# Patient Record
Sex: MALE | URBAN - METROPOLITAN AREA
[De-identification: names, ages, dates, MRNs, and addresses within clinical notes are randomized per-mention and may not be internally consistent; named-entity substitution may affect disease eponyms.]

---

## 2017-01-31 ENCOUNTER — APPOINTMENT (OUTPATIENT)
Age: 42
Setting detail: DERMATOLOGY
End: 2017-02-02

## 2017-01-31 DIAGNOSIS — B35.6 TINEA CRURIS: ICD-10-CM

## 2017-01-31 DIAGNOSIS — L23.9 ALLERGIC CONTACT DERMATITIS, UNSPECIFIED CAUSE: ICD-10-CM

## 2017-01-31 PROCEDURE — OTHER PRESCRIPTION: OTHER

## 2017-01-31 PROCEDURE — 99202 OFFICE O/P NEW SF 15 MIN: CPT

## 2017-01-31 PROCEDURE — OTHER COUNSELING: OTHER

## 2017-01-31 PROCEDURE — OTHER OTHER: OTHER

## 2017-01-31 PROCEDURE — OTHER TREATMENT REGIMEN: OTHER

## 2017-01-31 RX ORDER — TRIAMCINOLONE ACETONIDE 1 MG/G
CREAM TOPICAL
Qty: 1 | Refills: 2 | Status: ERX | COMMUNITY
Start: 2017-01-31

## 2017-01-31 ASSESSMENT — LOCATION SIMPLE DESCRIPTION DERM
LOCATION SIMPLE: ABDOMEN
LOCATION SIMPLE: GENITALIA

## 2017-01-31 ASSESSMENT — LOCATION DETAILED DESCRIPTION DERM
LOCATION DETAILED: PERIUMBILICAL SKIN
LOCATION DETAILED: GENITALIA

## 2017-01-31 ASSESSMENT — LOCATION ZONE DERM
LOCATION ZONE: GENITALIA
LOCATION ZONE: TRUNK

## 2017-01-31 NOTE — PROCEDURE: OTHER
Detail Level: Simple
Other (Free Text): LICHENIFIED PLAQUE. SUSPECT NICKEL ALLERGY. DISCUSSED ALLERGY AND AVOIDANCE.
Note Text (......Xxx Chief Complaint.): This diagnosis correlates with the

## 2020-12-07 ENCOUNTER — OFFICE VISIT (OUTPATIENT)
Dept: URBAN - METROPOLITAN AREA CLINIC 33 | Facility: CLINIC | Age: 45
End: 2020-12-07
Payer: COMMERCIAL

## 2020-12-07 DIAGNOSIS — H52.4 PRESBYOPIA: ICD-10-CM

## 2020-12-07 PROCEDURE — 99203 OFFICE O/P NEW LOW 30 MIN: CPT | Performed by: OPTOMETRIST

## 2020-12-07 PROCEDURE — 92015 DETERMINE REFRACTIVE STATE: CPT | Performed by: OPTOMETRIST

## 2020-12-07 ASSESSMENT — VISUAL ACUITY
OD: 20/20
OS: 20/20

## 2020-12-07 ASSESSMENT — KERATOMETRY
OD: 43.50
OS: 43.63

## 2020-12-07 NOTE — IMPRESSION/PLAN
Impression: Presbyopia: H52.4. Plan: Educated patient about today's findings. Order refraction to determine patient's best corrected visual acuity as it relates to presbyopia- dispensed Rx to patient. Patient was educated about 1-2 week adaptation period with new Rx.

1st time glasses.

## 2020-12-07 NOTE — IMPRESSION/PLAN
Impression: Neoplasm of uncertain behavior of connective tissue of eyelid: D48.1. Plan: Growing eyelid lesion, RLL near punctum. Patient denies pain or redness.  Refer to Dr. Magalys Andrew for removal

## 2020-12-09 ENCOUNTER — OFFICE VISIT (OUTPATIENT)
Dept: URBAN - METROPOLITAN AREA CLINIC 33 | Facility: CLINIC | Age: 45
End: 2020-12-09
Payer: COMMERCIAL

## 2020-12-09 PROCEDURE — 99204 OFFICE O/P NEW MOD 45 MIN: CPT | Performed by: OPHTHALMOLOGY

## 2020-12-09 PROCEDURE — 92285 EXTERNAL OCULAR PHOTOGRAPHY: CPT | Performed by: OPHTHALMOLOGY

## 2020-12-09 NOTE — IMPRESSION/PLAN
Impression: Neoplasm of uncertain behavior of connective tissue of eyelid: D48.1. Photo Interpretation: supports clinical findings and dx documented in chart. Plan: Discussed diagnosis in detail with patient. Discussed treatment options with patient. Recommend biopsy, obtain prior authorization, further conduct after biopsy results. RBA's discussed in detail with patient today, patient understands and agrees and wishes to proceed with procedure. Schedule excision of lesion with biopsy of the Right Upper Eyelid, by the punctum, NAD.

*Patient is currently being treated during COVID-19 epidemic, which limits our availability to establish proper follow up care. Patient understands these limitations, and is aware that we will continue treatment and follow up based on our availability, as determined by local state and federal guidelines.

## 2021-01-06 ENCOUNTER — PROCEDURE (OUTPATIENT)
Dept: URBAN - METROPOLITAN AREA CLINIC 33 | Facility: CLINIC | Age: 46
End: 2021-01-06
Payer: COMMERCIAL

## 2021-01-06 DIAGNOSIS — D48.1 NEOPLASM OF UNCERTAIN BEHAVIOR OF CONNECTIVE TISSUE OF EYELID: Primary | ICD-10-CM

## 2021-01-06 PROCEDURE — 67840 REMOVE EYELID LESION: CPT | Performed by: OPHTHALMOLOGY

## 2021-01-06 RX ORDER — ERYTHROMYCIN 5 MG/G
OINTMENT OPHTHALMIC
Qty: 1 | Refills: 0 | Status: ACTIVE
Start: 2021-01-06